# Patient Record
Sex: MALE | Race: WHITE
[De-identification: names, ages, dates, MRNs, and addresses within clinical notes are randomized per-mention and may not be internally consistent; named-entity substitution may affect disease eponyms.]

---

## 2018-06-23 ENCOUNTER — HOSPITAL ENCOUNTER (EMERGENCY)
Dept: HOSPITAL 25 - ED | Age: 68
LOS: 1 days | Discharge: HOME | End: 2018-06-24
Payer: MEDICARE

## 2018-06-23 DIAGNOSIS — Z88.0: ICD-10-CM

## 2018-06-23 DIAGNOSIS — Z82.49: ICD-10-CM

## 2018-06-23 DIAGNOSIS — Z79.01: ICD-10-CM

## 2018-06-23 DIAGNOSIS — K21.9: ICD-10-CM

## 2018-06-23 DIAGNOSIS — F41.9: ICD-10-CM

## 2018-06-23 DIAGNOSIS — R00.2: Primary | ICD-10-CM

## 2018-06-23 DIAGNOSIS — I48.91: ICD-10-CM

## 2018-06-23 DIAGNOSIS — Z81.1: ICD-10-CM

## 2018-06-23 DIAGNOSIS — Z91.040: ICD-10-CM

## 2018-06-23 DIAGNOSIS — I10: ICD-10-CM

## 2018-06-23 PROCEDURE — 93005 ELECTROCARDIOGRAM TRACING: CPT

## 2018-06-23 PROCEDURE — 84484 ASSAY OF TROPONIN QUANT: CPT

## 2018-06-23 PROCEDURE — 71045 X-RAY EXAM CHEST 1 VIEW: CPT

## 2018-06-23 PROCEDURE — 85610 PROTHROMBIN TIME: CPT

## 2018-06-23 PROCEDURE — 83735 ASSAY OF MAGNESIUM: CPT

## 2018-06-23 PROCEDURE — 36415 COLL VENOUS BLD VENIPUNCTURE: CPT

## 2018-06-23 PROCEDURE — 84443 ASSAY THYROID STIM HORMONE: CPT

## 2018-06-23 PROCEDURE — 85730 THROMBOPLASTIN TIME PARTIAL: CPT

## 2018-06-23 PROCEDURE — 83880 ASSAY OF NATRIURETIC PEPTIDE: CPT

## 2018-06-23 PROCEDURE — 85025 COMPLETE CBC W/AUTO DIFF WBC: CPT

## 2018-06-23 PROCEDURE — 84436 ASSAY OF TOTAL THYROXINE: CPT

## 2018-06-23 PROCEDURE — 99283 EMERGENCY DEPT VISIT LOW MDM: CPT

## 2018-06-23 PROCEDURE — 80053 COMPREHEN METABOLIC PANEL: CPT

## 2018-06-23 PROCEDURE — 83605 ASSAY OF LACTIC ACID: CPT

## 2018-06-24 VITALS — SYSTOLIC BLOOD PRESSURE: 119 MMHG | DIASTOLIC BLOOD PRESSURE: 83 MMHG

## 2018-06-24 LAB
BASOPHILS # BLD AUTO: 0.1 10^3/UL (ref 0–0.2)
EOSINOPHIL # BLD AUTO: 0.3 10^3/UL (ref 0–0.6)
HCT VFR BLD AUTO: 41 % (ref 42–52)
HGB BLD-MCNC: 13.9 G/DL (ref 14–18)
INR PPP/BLD: 1.05 (ref 0.77–1.02)
LYMPHOCYTES # BLD AUTO: 3.1 10^3/UL (ref 1–4.8)
MCH RBC QN AUTO: 32 PG (ref 27–31)
MCHC RBC AUTO-ENTMCNC: 34 G/DL (ref 31–36)
MCV RBC AUTO: 94 FL (ref 80–94)
MONOCYTES # BLD AUTO: 0.5 10^3/UL (ref 0–0.8)
NEUTROPHILS # BLD AUTO: 2.8 10^3/UL (ref 1.5–7.7)
NRBC # BLD AUTO: 0 10^3/UL
NRBC BLD QL AUTO: 0.1
PLATELET # BLD AUTO: 226 10^3/UL (ref 150–450)
RBC # BLD AUTO: 4.36 10^6/UL (ref 4–5.4)
WBC # BLD AUTO: 6.4 10^3/UL (ref 3.5–10.8)

## 2018-06-24 NOTE — RAD
INDICATION: Chest pain



COMPARISON: None

 

TECHNIQUE: An AP portable view obtained at 0058 hours is submitted.



FINDINGS: 



Bones/Soft Tissues: There are no acute bony findings.



Cardiomediastinal: The cardiomediastinal silhouette is normal. 



Lungs: There are no infiltrates.



Pleura: There are no pleural effusions. 



Other: None



IMPRESSION:  NO ACTIVE DISEASE.

## 2018-06-24 NOTE — ED
Racquel SEAMAN SooYoung, scribed for Arabella Simental MD on 06/24/18 at 0043 .





Palpitations / Dysrhythmia





- HPI Summary


HPI Summary: 





A 68 y/o M presents to ED with c/o dysrhythmia onset PTA when he was going to 

bed. Pert PMHx: afib. Pt states he could feel the onset of afib and that it 

typically happens when he reclines. Last episode was two years ago, which 

lasted approx 12 hours. He takes Eliquis, Metoprolol. He did not take extra 

meds PTA. His pulse at the time was 84 bpm which is much higher than his 

baseline of 40-50 bpm. PMHx: ulcerative colitis, HTN, HDL. Denies SHx.





- History of Current Complaint


Chief Complaint: EDDysrhythmPalp


Time Seen by Provider: 06/24/18 00:13


Hx Obtained From: Patient


Onset/Duration: Sudden Onset, Resolved


Timing: Intermittent Episodes Lasting:


Severity Currently: None


Character: Fast


Associated Signs & Symptoms: Negative





- Allergy/Home Medications


Allergies/Adverse Reactions: 


 Allergies











Allergy/AdvReac Type Severity Reaction Status Date / Time


 


latex Allergy  Rash Verified 06/23/18 23:22


 


Penicillins Allergy  Unknown Verified 06/23/18 23:22





   Reaction  





   Details  


 


[place] AdvReac  See Comment Uncoded 06/28/16 16:13














PMH/Surg Hx/FS Hx/Imm Hx


Previously Healthy: No


Endocrine/Hematology History: 


   Denies: Hx Diabetes


Cardiovascular History: Reports: Hx Atrial Fibrillation, Hx Hypertension, Other 

Cardiovascular Problems/Disorders - ARRHYTHMIA IN PAST- RESOLVED SPONTANEOUSLY


   Denies: Hx Pacemaker/ICD


Respiratory History: Reports: Other Respiratory Problems/Disorders - NASAL 

POLYPS


   Denies: Hx Sleep Apnea - RECENTLY TESTED


GI History: Reports: Hx Gastroesophageal Reflux Disease, Other GI Disorders - 

ULCERATIVE COLITIS- CONTROLLED FOR PAST 2-3 YEARS


 History: 


   Denies: Hx Renal Disease


Sensory History: Reports: Hx Contacts or Glasses - GLASSES FOR READING


   Denies: Hx Hearing Aid


Opthamlomology History: Reports: Hx Contacts or Glasses - GLASSES FOR READING


Psychiatric History: Reports: Hx Anxiety - CONTROLLED WITH MED


   Denies: Hx Panic Disorder





- Surgical History


Surgery Procedure, Year, and Place: lt groin hernia   1972.  Lt LONG FINGER & 

Rt RING FINGER - TRIGGER FINGER


Hx Anesthesia Reactions: No


Infectious Disease History: No


Infectious Disease History: 


   Denies: Traveled Outside the [place] in Last 30 Days





- Family History


Known Family History: Positive: Cardiac Disease, Hypertension





- Social History


Occupation: Employed Full-time


Lives: With Family


Alcohol Use: Occasionally


Alcohol Amount: one time every 2 wks


Hx Substance Use: No


Substance Use Type: Reports: None


Hx Tobacco Use: No


Smoking Status (MU): Never Smoked Tobacco





Review of Systems


Negative: Fever


Positive: Palpitations


All Other Systems Reviewed And Are Negative: Yes





Physical Exam





- Summary


Physical Exam Summary: 





GENERAL:  Patient is a well-developed and nourished M who is lying comfortable 

in the stretcher.  Patient is not in any acute respiratory distress.


HEAD AND FACE: Normocephalic


EYES: PERRLA, EOMI x 2.


EARS: Hearing grossly intact.


MOUTH: Oropharynx within normal limits.


NECK: Supple, trachea is midline, no adenopathy, no JVD, no carotid bruit.


CHEST: Symmetric, no tenderness at palpation


LUNGS: Clear to auscultation bilaterally. No wheezing or crackles.


CVS: Irregularly irregular, normal rate, S1 and S2 present, no murmurs or 

gallops appreciated.


ABDOMEN: Soft, non-tender. Bowel sounds are normal. No abdominal abnormal 

pulsations.


EXTREMITIES: Full ROM in all major joints, no edema, no cyanosis or clubbing.


NEURO: Alert and oriented x 3. No acute neurological deficits. Speech is normal 

and follows commands.





Triage Information Reviewed: Yes


Vital Signs On Initial Exam: 


 Initial Vitals











Temp Pulse Resp BP Pulse Ox


 


 98.1 F   66   20   140/87   95 


 


 06/23/18 23:18  06/23/18 23:18  06/23/18 23:18  06/23/18 23:18  06/23/18 23:18











Vital Signs Reviewed: Yes





Diagnostics





- Vital Signs


 Vital Signs











  Temp Pulse Resp BP Pulse Ox


 


 06/23/18 23:18  98.1 F  66  20  140/87  95














- Laboratory


Result Diagrams: 


 06/24/18 00:45





 06/24/18 00:45


Lab Statement: Any lab studies that have been ordered have been reviewed, and 

results considered in the medical decision making process.





- Radiology


  ** CXR


Xray Interpretation: No Acute Changes - No PNA


Radiology Interpretation Completed By: ED Physician





- EKG


  ** [id number]


EKG Rhythm: Atrial Fibrillation - 80 bpm


EKG Comparison: No Significant Change - from 04/04/16





Re-Evaluation





- Re-Evaluation


  ** 1


Re-Evaluation Time: 01:56


Change: Improved


Comment: Discussing results with pt. Will D/C. Pt voiced understanding.





Course/Dx





- Course


Course Of Treatment: A 68 y/o M presents to ED with c/o dysrhythmia onset PTA 

when he was going to bed. Pert PMHx: afib. Pt states he could feel the onset of 

afib and that it typically happens when he reclines. Last episode was two years 

ago, which lasted approx 12 hours. He takes Eliquis, Metoprolol. He did not 

take extra meds PTA. His pulse at the time was 84 bpm which is much higher than 

his baseline of 40-50 bpm. PMHx: ulcerative colitis, HTN, HDL. Denies SHx.  

Workup is unremarkable and pt is found to be in afib but rate controlled. Will 

be DC home, with strict return precautions, otherwise pt is to f/u with his 

cardiologist. Pt is hemodynamically stable upon D/C.





- Diagnoses


Provider Diagnoses: 


 Palpitations








Discharge





- Sign-Out/Discharge


Documenting (check all that apply): Discharge/Admit/Transfer - D/C





- Discharge Plan


Condition: Stable


Disposition: HOME


Patient Education Materials:  Heart Palpitations (ED)


Referrals: 


Adal Rushing DO [Primary Care Provider] - 


Additional Instructions: 


Please return to the ED if you experience new or worsening symptoms. 





The documentation as recorded by the Racquel berumen SooYoung accurately 

reflects the service I personally performed and the decisions made by me, Arabella Simental MD.